# Patient Record
Sex: MALE | Race: WHITE
[De-identification: names, ages, dates, MRNs, and addresses within clinical notes are randomized per-mention and may not be internally consistent; named-entity substitution may affect disease eponyms.]

---

## 2021-09-13 ENCOUNTER — APPOINTMENT (OUTPATIENT)
Dept: ORTHOPEDIC SURGERY | Facility: CLINIC | Age: 76
End: 2021-09-13
Payer: MEDICARE

## 2021-09-13 VITALS — BODY MASS INDEX: 23.3 KG/M2 | WEIGHT: 145 LBS | HEIGHT: 66 IN

## 2021-09-13 DIAGNOSIS — M77.10 LATERAL EPICONDYLITIS, UNSPECIFIED ELBOW: ICD-10-CM

## 2021-09-13 DIAGNOSIS — M25.521 PAIN IN RIGHT ELBOW: ICD-10-CM

## 2021-09-13 PROBLEM — Z00.00 ENCOUNTER FOR PREVENTIVE HEALTH EXAMINATION: Status: ACTIVE | Noted: 2021-09-13

## 2021-09-13 PROCEDURE — 99203 OFFICE O/P NEW LOW 30 MIN: CPT

## 2021-09-13 PROCEDURE — 73070 X-RAY EXAM OF ELBOW: CPT | Mod: RT

## 2021-09-13 NOTE — PHYSICAL EXAM
[de-identified] : Right elbow\par Constitutional: \par The patient is healthy-appearing and in no apparent distress. \par \par Cardiovascular System: \par There is capillary refill less than 2 seconds. \par \par Skin: \par There is no skin abnormalities of elbow.\par \par Right Elbow: \par Appearance: \par There is no deformity, induration, redness, swelling, or warmth and a normal carrying angle. \par \par Bony Palpation: \par There is no tenderness of the medial epicondyle.\par There is no tenderness of olecranon.\par There is no tenderness of the ulnatrochlea articulation.\par There is no tenderness of the coronoid process.\par There is no tenderness of the radial head.\par There is no tenderness of the radiocapitellar joint.\par There is tenderness of the lateral epicondyle. \par \par Soft Tissue Palpation: \par There is no tenderness of the ulnar nerve.\par There is no tenderness of the biceps insertion.\par There is no tenderness of the pronator teres.\par There is no tenderness of the flexor carpi ulnaris.\par There is no tenderness of the flexor carpi radialis.\par There is no tenderness of the annular ligament of the radius.\par There is no tenderness of the brachioradialis.\par There is no tenderness of the radial collateral ligament.\par There is no tenderness of the ulnar collateral ligament.\par There is no tenderness of the antecubital fossa.\par There is tenderness of the extensor carpi radialis brevis.\par There is tenderness of the extensor carpi radialis longus.\par \par Range of Motion:  \par There is full range of motion both actively and passively. \par \par Stability:\par There is no dislocation or laxity to testing.\par  \par Strength: \par There is 5/5 elbow flexion, extension, supination and pronation.  \par \par Neurologic:\par There is normal sensation C5-T1 to light touch. \par \par Psychiatric: \par The patient demonstrates a normal mood and affect and is active and alert. [de-identified] : X-ray right elbow.  There is no significant bony / soft tissue abnormality, arthritis, or fracture.\par

## 2021-09-13 NOTE — HISTORY OF PRESENT ILLNESS
[de-identified] : Location: Right elbow - lateral\par Duration: 1 month\par Context: felt pain while playing tennis\par Quality: sharp\par Aggravating factors: playing tennis, playing the violin, holding heavy objects, ROM\par Associated symptoms: swelling\par Conservative treatment: ice\par Prior studies: N/A

## 2021-09-13 NOTE — ASSESSMENT
[FreeTextEntry1] : Discussed at length the patient exam history and imaging as well as treatment options.  Patient this time elects home exercise as well as counterforce bracing.  Offered cortisone injection and patient declines at this time.  Patient to follow up on an as-needed basis for consideration to injection of persistent and is aware that this may take several months to fully resolve.  If nonoperative treatments she'll provide relief consideration to surgery

## 2021-10-05 ENCOUNTER — APPOINTMENT (OUTPATIENT)
Dept: ORTHOPEDIC SURGERY | Facility: CLINIC | Age: 76
End: 2021-10-05